# Patient Record
Sex: FEMALE | Race: WHITE | NOT HISPANIC OR LATINO | Employment: UNEMPLOYED | URBAN - METROPOLITAN AREA
[De-identification: names, ages, dates, MRNs, and addresses within clinical notes are randomized per-mention and may not be internally consistent; named-entity substitution may affect disease eponyms.]

---

## 2018-06-07 ENCOUNTER — OFFICE VISIT (OUTPATIENT)
Dept: OPHTHALMOLOGY | Facility: CLINIC | Age: 48
End: 2018-06-07
Payer: COMMERCIAL

## 2018-06-07 DIAGNOSIS — D31.32 CHOROIDAL NEVUS, LEFT EYE: Primary | ICD-10-CM

## 2018-06-07 DIAGNOSIS — H52.03 HYPEROPIA WITH PRESBYOPIA, BILATERAL: ICD-10-CM

## 2018-06-07 DIAGNOSIS — H35.721 RETINAL PIGMENT EPITHELIAL DETACHMENT OF RIGHT EYE: ICD-10-CM

## 2018-06-07 DIAGNOSIS — H52.4 HYPEROPIA WITH PRESBYOPIA, BILATERAL: ICD-10-CM

## 2018-06-07 PROCEDURE — 92250 FUNDUS PHOTOGRAPHY W/I&R: CPT | Mod: S$GLB,,, | Performed by: OPTOMETRIST

## 2018-06-07 PROCEDURE — 92004 COMPRE OPH EXAM NEW PT 1/>: CPT | Mod: S$GLB,,, | Performed by: OPTOMETRIST

## 2018-06-07 PROCEDURE — 92015 DETERMINE REFRACTIVE STATE: CPT | Mod: S$GLB,,, | Performed by: OPTOMETRIST

## 2018-06-07 PROCEDURE — 92310 CONTACT LENS FITTING OU: CPT | Mod: ,,, | Performed by: OPTOMETRIST

## 2018-06-08 NOTE — PROGRESS NOTES
HPI     PTs last exam was 1 year ago. PT c/o blurred overall va and wears cls   full time. otc readers prn. Gls prn.   HPI    Any vision changes since last exam: no  Eye pain: no  Other ocular symptoms: no    Do you wear currently wear glasses or contacts? both    Interested in contacts today? Yes, wears AV 1 day moist    Do you plan on getting new glasses today? If needed      Last edited by Melissa Vieyra MA on 6/7/2018  2:23 PM. (History)            Assessment /Plan     For exam results, see Encounter Report.    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  Baseline mOCT today  Great va, pt states was prev noted at last exam  Monitor 12 months    Hyperopia with presbyopia, bilateral  Eyeglass Final Rx     Eyeglass Final Rx       Sphere Cylinder Axis Add    Right +4.50   +2.00    Left +2.50 +0.50 055 +2.00    Type:  PAL    Expiration Date:  6/8/2019    Comments:  Digitally surfaced progressive recommended              Contact Lens Prescription (6/7/2018)        Brand Base Curve Sphere    Right 1-Day Acuvue Moist 8.50 +4.50    Left 1-Day Acuvue Moist 8.50 +2.50    Expiration Date:  6/8/2019    Replacement:  Daily    Wearing Schedule:  Daily wear        Dispensed trial contact lenses today. Patient is to wear lenses for 1 week. Ok to order supply if no problems. RTC PRN if any problems arise.      RTC 1 yr for dilated eye exam, mOCT and Optos or PRN if any problems.   Discussed above and answered questions.

## 2018-06-15 ENCOUNTER — TELEPHONE (OUTPATIENT)
Dept: OPHTHALMOLOGY | Facility: CLINIC | Age: 48
End: 2018-06-15

## 2018-06-15 NOTE — TELEPHONE ENCOUNTER
----- Message from Justine Freire sent at 6/15/2018  8:50 AM CDT -----  Contact: pt  Pt has a sty and is concerned about swelling in her cheek. Is this normal or does she need to be seen?

## 2018-06-15 NOTE — TELEPHONE ENCOUNTER
Spoke with pt, Romelia is on LLL with some mild swelling below. Offered pt same day appointment with DNL. PT declined. Advised pt to use warm compresses and if not better to go come in Monday morning or if worsens over the weekend to go to urgent care. Pt expressed understanding.

## 2022-04-07 ENCOUNTER — OFFICE VISIT (OUTPATIENT)
Dept: OPHTHALMOLOGY | Facility: CLINIC | Age: 52
End: 2022-04-07
Payer: COMMERCIAL

## 2022-04-07 DIAGNOSIS — H16.002 CORNEAL ULCER OF LEFT EYE: Primary | ICD-10-CM

## 2022-04-07 PROCEDURE — 1159F MED LIST DOCD IN RCRD: CPT | Mod: CPTII,S$GLB,, | Performed by: OPTOMETRIST

## 2022-04-07 PROCEDURE — 99203 OFFICE O/P NEW LOW 30 MIN: CPT | Mod: S$GLB,,, | Performed by: OPTOMETRIST

## 2022-04-07 PROCEDURE — 99999 PR PBB SHADOW E&M-NEW PATIENT-LVL II: ICD-10-PCS | Mod: PBBFAC,,, | Performed by: OPTOMETRIST

## 2022-04-07 PROCEDURE — 99999 PR PBB SHADOW E&M-NEW PATIENT-LVL II: CPT | Mod: PBBFAC,,, | Performed by: OPTOMETRIST

## 2022-04-07 PROCEDURE — 1160F PR REVIEW ALL MEDS BY PRESCRIBER/CLIN PHARMACIST DOCUMENTED: ICD-10-PCS | Mod: CPTII,S$GLB,, | Performed by: OPTOMETRIST

## 2022-04-07 PROCEDURE — 99203 PR OFFICE/OUTPT VISIT, NEW, LEVL III, 30-44 MIN: ICD-10-PCS | Mod: S$GLB,,, | Performed by: OPTOMETRIST

## 2022-04-07 PROCEDURE — 1160F RVW MEDS BY RX/DR IN RCRD: CPT | Mod: CPTII,S$GLB,, | Performed by: OPTOMETRIST

## 2022-04-07 PROCEDURE — 1159F PR MEDICATION LIST DOCUMENTED IN MEDICAL RECORD: ICD-10-PCS | Mod: CPTII,S$GLB,, | Performed by: OPTOMETRIST

## 2022-04-07 RX ORDER — TOBRAMYCIN AND DEXAMETHASONE 3; 1 MG/ML; MG/ML
1-2 SUSPENSION/ DROPS OPHTHALMIC 4 TIMES DAILY
Qty: 5 ML | Refills: 0 | Status: SHIPPED | OUTPATIENT
Start: 2022-04-07 | End: 2022-04-17

## 2022-04-07 NOTE — PROGRESS NOTES
HPI     New patient to [facility]  Laterality: OS  Pain Scale:  2-3  Onset:   Last night  Discharge:   No  A.M. Matting:  No  Itch:   No  Redness:   Yes  Photophobia:   Yes  Foreign body sensation:   Yes  Deep pain:   No  Previous occurrence:   No  Drops:   Otc gtts   Contact lens wear? Yes       Last edited by Kathrine Hardin MA on 4/7/2022  1:31 PM. (History)             Assessment /Plan     For exam results, see Encounter Report.    Corneal ulcer of left eye      Marginal corneal ulcer in left eye, with history of CTL wear. Start Tobradex 1 drop qid. Patient has mild AC reaction. Stop CTL wear. RTC 1 day for f/u before weekend, sooner if any changes to vision or worsening symptoms.

## 2022-04-08 ENCOUNTER — OFFICE VISIT (OUTPATIENT)
Dept: OPHTHALMOLOGY | Facility: CLINIC | Age: 52
End: 2022-04-08
Payer: COMMERCIAL

## 2022-04-08 DIAGNOSIS — H16.002 CORNEAL ULCER OF LEFT EYE: Primary | ICD-10-CM

## 2022-04-08 PROCEDURE — 1159F PR MEDICATION LIST DOCUMENTED IN MEDICAL RECORD: ICD-10-PCS | Mod: CPTII,S$GLB,, | Performed by: OPTOMETRIST

## 2022-04-08 PROCEDURE — 99213 OFFICE O/P EST LOW 20 MIN: CPT | Mod: S$GLB,,, | Performed by: OPTOMETRIST

## 2022-04-08 PROCEDURE — 1159F MED LIST DOCD IN RCRD: CPT | Mod: CPTII,S$GLB,, | Performed by: OPTOMETRIST

## 2022-04-08 PROCEDURE — 1160F PR REVIEW ALL MEDS BY PRESCRIBER/CLIN PHARMACIST DOCUMENTED: ICD-10-PCS | Mod: CPTII,S$GLB,, | Performed by: OPTOMETRIST

## 2022-04-08 PROCEDURE — 99999 PR PBB SHADOW E&M-EST. PATIENT-LVL II: CPT | Mod: PBBFAC,,, | Performed by: OPTOMETRIST

## 2022-04-08 PROCEDURE — 99213 PR OFFICE/OUTPT VISIT, EST, LEVL III, 20-29 MIN: ICD-10-PCS | Mod: S$GLB,,, | Performed by: OPTOMETRIST

## 2022-04-08 PROCEDURE — 1160F RVW MEDS BY RX/DR IN RCRD: CPT | Mod: CPTII,S$GLB,, | Performed by: OPTOMETRIST

## 2022-04-08 PROCEDURE — 99999 PR PBB SHADOW E&M-EST. PATIENT-LVL II: ICD-10-PCS | Mod: PBBFAC,,, | Performed by: OPTOMETRIST

## 2022-04-08 NOTE — PROGRESS NOTES
HPI     Last seen by [facility]  Patient here today for 1 day cornea follow up  Instructed to discontinue CTL wear  Using Tobradex QID OS  Patient states eye is much better no redness    Last edited by Alicia Richards, PCT on 4/8/2022  9:16 AM. (History)            Assessment /Plan     For exam results, see Encounter Report.    Corneal ulcer of left eye    Improving, AC reaction much better. Continue Tobradex 1 drop qid x 6 days. RTC Thursday for f/u with DFE OD and refraction. Sooner if any changes to vision or worsening symptoms.

## 2022-04-14 ENCOUNTER — OFFICE VISIT (OUTPATIENT)
Dept: OPHTHALMOLOGY | Facility: CLINIC | Age: 52
End: 2022-04-14
Payer: COMMERCIAL

## 2022-04-14 DIAGNOSIS — H52.03 HYPEROPIA OF BOTH EYES: ICD-10-CM

## 2022-04-14 DIAGNOSIS — H16.002 CORNEAL ULCER OF LEFT EYE: Primary | ICD-10-CM

## 2022-04-14 PROCEDURE — 92310 PR CONTACT LENS FITTING (NO CHANGE): ICD-10-PCS | Mod: CSM,S$GLB,, | Performed by: OPTOMETRIST

## 2022-04-14 PROCEDURE — 99999 PR PBB SHADOW E&M-EST. PATIENT-LVL II: CPT | Mod: PBBFAC,,, | Performed by: OPTOMETRIST

## 2022-04-14 PROCEDURE — 1160F PR REVIEW ALL MEDS BY PRESCRIBER/CLIN PHARMACIST DOCUMENTED: ICD-10-PCS | Mod: CPTII,S$GLB,, | Performed by: OPTOMETRIST

## 2022-04-14 PROCEDURE — 92310 CONTACT LENS FITTING OU: CPT | Mod: CSM,S$GLB,, | Performed by: OPTOMETRIST

## 2022-04-14 PROCEDURE — 1159F PR MEDICATION LIST DOCUMENTED IN MEDICAL RECORD: ICD-10-PCS | Mod: CPTII,S$GLB,, | Performed by: OPTOMETRIST

## 2022-04-14 PROCEDURE — 1159F MED LIST DOCD IN RCRD: CPT | Mod: CPTII,S$GLB,, | Performed by: OPTOMETRIST

## 2022-04-14 PROCEDURE — 99999 PR PBB SHADOW E&M-EST. PATIENT-LVL II: ICD-10-PCS | Mod: PBBFAC,,, | Performed by: OPTOMETRIST

## 2022-04-14 PROCEDURE — 92012 PR EYE EXAM, EST PATIENT,INTERMED: ICD-10-PCS | Mod: S$GLB,,, | Performed by: OPTOMETRIST

## 2022-04-14 PROCEDURE — 92015 DETERMINE REFRACTIVE STATE: CPT | Mod: S$GLB,,, | Performed by: OPTOMETRIST

## 2022-04-14 PROCEDURE — 92012 INTRM OPH EXAM EST PATIENT: CPT | Mod: S$GLB,,, | Performed by: OPTOMETRIST

## 2022-04-14 PROCEDURE — 1160F RVW MEDS BY RX/DR IN RCRD: CPT | Mod: CPTII,S$GLB,, | Performed by: OPTOMETRIST

## 2022-04-14 PROCEDURE — 92015 PR REFRACTION: ICD-10-PCS | Mod: S$GLB,,, | Performed by: OPTOMETRIST

## 2022-04-14 NOTE — PROGRESS NOTES
HPI     Last seen by [facility]  Patient here today for 7 day cornea follow up with DFE and Refraction  Patient states eye is much better   Patient states she is using tobradex qid OS    Last edited by Maximo Lockett, OD on 4/14/2022 11:17 AM. (History)            Assessment /Plan     For exam results, see Encounter Report.    Corneal ulcer of left eye  Resolved, taper Tobradex bid x 1 week then stop. CTL hygiene reviewed.   Hyperopia of both eyes  Eyeglass Final Rx     Eyeglass Final Rx       Sphere Cylinder Axis Add    Right +5.00 +0.75 140 +2.50    Left +2.75 DS  +2.50    Type: PAL    Expiration Date: 4/14/2023              Contact Lens Final Rx     Final Contact Lens Rx       Brand Base Curve Diameter Sphere    Right Dailies Total 1 8.5 14.1 +5.00    Left Dailies Total 1 8.5 14.1 +2.75    Expiration Date: 4/14/2023    Replacement: Daily    Wearing Schedule: Daily Wear                RTC 1 yr for dilated eye exam or sooner if any changes to vision.   Discussed above and answered questions.

## 2023-11-14 ENCOUNTER — OFFICE VISIT (OUTPATIENT)
Dept: OPHTHALMOLOGY | Facility: CLINIC | Age: 53
End: 2023-11-14
Payer: COMMERCIAL

## 2023-11-14 DIAGNOSIS — H52.203 HYPEROPIA WITH ASTIGMATISM AND PRESBYOPIA, BILATERAL: ICD-10-CM

## 2023-11-14 DIAGNOSIS — H52.03 HYPEROPIA WITH ASTIGMATISM AND PRESBYOPIA, BILATERAL: ICD-10-CM

## 2023-11-14 DIAGNOSIS — D31.32 CHOROIDAL NEVUS OF LEFT EYE: Primary | ICD-10-CM

## 2023-11-14 DIAGNOSIS — H52.4 HYPEROPIA WITH ASTIGMATISM AND PRESBYOPIA, BILATERAL: ICD-10-CM

## 2023-11-14 PROCEDURE — 92014 PR EYE EXAM, EST PATIENT,COMPREHESV: ICD-10-PCS | Mod: S$GLB,,, | Performed by: OPTOMETRIST

## 2023-11-14 PROCEDURE — 1159F PR MEDICATION LIST DOCUMENTED IN MEDICAL RECORD: ICD-10-PCS | Mod: CPTII,S$GLB,, | Performed by: OPTOMETRIST

## 2023-11-14 PROCEDURE — 92015 PR REFRACTION: ICD-10-PCS | Mod: S$GLB,,, | Performed by: OPTOMETRIST

## 2023-11-14 PROCEDURE — 92014 COMPRE OPH EXAM EST PT 1/>: CPT | Mod: S$GLB,,, | Performed by: OPTOMETRIST

## 2023-11-14 PROCEDURE — 92015 DETERMINE REFRACTIVE STATE: CPT | Mod: S$GLB,,, | Performed by: OPTOMETRIST

## 2023-11-14 PROCEDURE — 1159F MED LIST DOCD IN RCRD: CPT | Mod: CPTII,S$GLB,, | Performed by: OPTOMETRIST

## 2023-11-14 PROCEDURE — 92310 CONTACT LENS FITTING OU: CPT | Mod: CSM,S$GLB,, | Performed by: OPTOMETRIST

## 2023-11-14 PROCEDURE — 99999 PR PBB SHADOW E&M-EST. PATIENT-LVL II: ICD-10-PCS | Mod: PBBFAC,,, | Performed by: OPTOMETRIST

## 2023-11-14 PROCEDURE — 92310 PR CONTACT LENS FITTING (NO CHANGE): ICD-10-PCS | Mod: CSM,S$GLB,, | Performed by: OPTOMETRIST

## 2023-11-14 PROCEDURE — 99999 PR PBB SHADOW E&M-EST. PATIENT-LVL II: CPT | Mod: PBBFAC,,, | Performed by: OPTOMETRIST

## 2023-11-14 NOTE — PROGRESS NOTES
HPI     Annual Exam            Comments: Patient reports for annual eye exam.          Last edited by Ebenezer Cano on 11/14/2023  3:01 PM.            Assessment /Plan     For exam results, see Encounter Report.    Choroidal nevus of left eye  Stable, RTC next available for Optos Photo.  Flat, no lipo.    Hyperopia with astigmatism and presbyopia, bilateral  Eyeglass Final Rx       Eyeglass Final Rx         Sphere Cylinder Axis Add    Right +5.00 +0.50 134 +2.50    Left +2.50 +1.00 050 +2.50      Type: PAL    Expiration Date: 11/14/2024              Eyeglass Final Rx #2         Sphere Cylinder Axis Add    Right +5.00 +0.50 134     Left +2.50 +1.00 050       Type: SVL - Distance    Expiration Date: 11/14/2024              Eyeglass Final Rx Comments    [id number]                 Contact Lens Final Rx       Final Contact Lens Rx         Brand Base Curve Diameter Sphere    Right Dailies Total 1 8.5 14.1 +5.50    Left Dailies Total 1 8.5 14.1 +2.75      Expiration Date: 11/14/2024    Replacement: Daily    Wearing Schedule: Daily Wear                OU distance CTL trials fitted today, patient will require +2.25 DS OTC readers for near tasks.     Contact lens trials fitted in office today. Contact lens hygiene reviewed. Patient able to insert the lenses themselves with minimal difficulty. Patient ok to finalize Contact lens after 1 week of wear. RTC if still having difficulty with CTL trial after 1 week.       RTC next available for optos, sooner if changes to vision or worsening symptoms.  Discussed above and answered question.

## 2023-11-27 ENCOUNTER — OFFICE VISIT (OUTPATIENT)
Dept: OPHTHALMOLOGY | Facility: CLINIC | Age: 53
End: 2023-11-27
Payer: COMMERCIAL

## 2023-11-27 DIAGNOSIS — D31.32 CHOROIDAL NEVUS OF LEFT EYE: Primary | ICD-10-CM

## 2023-11-27 DIAGNOSIS — H52.03 HYPEROPIA WITH ASTIGMATISM AND PRESBYOPIA, BILATERAL: ICD-10-CM

## 2023-11-27 DIAGNOSIS — H52.4 HYPEROPIA WITH ASTIGMATISM AND PRESBYOPIA, BILATERAL: ICD-10-CM

## 2023-11-27 DIAGNOSIS — H52.203 HYPEROPIA WITH ASTIGMATISM AND PRESBYOPIA, BILATERAL: ICD-10-CM

## 2023-11-27 DIAGNOSIS — H52.03 HYPEROPIA OF BOTH EYES: ICD-10-CM

## 2023-11-27 PROCEDURE — 99499 UNLISTED E&M SERVICE: CPT | Mod: S$GLB,,, | Performed by: OPTOMETRIST

## 2023-11-27 PROCEDURE — 99499 NO LOS: ICD-10-PCS | Mod: S$GLB,,, | Performed by: OPTOMETRIST

## 2023-11-27 NOTE — PROGRESS NOTES
HPI    Patient reports for testing only. [facility] to call with results per patients   request   Last edited by Alicia Richards, PCT on 11/27/2023  4:04 PM.            Assessment /Plan     For exam results, see Encounter Report.    Choroidal nevus of left eye  Stable per optos photos continue close observation with optos at this time.     Hyperopia with astigmatism and presbyopia, bilateral  Contact Lens Final Rx       Final Contact Lens Rx         Brand Base Curve Diameter Sphere    Right Dailies Total 1 8.5 14.1 +5.50    Left Dailies Total 1 8.5 14.1 +2.75      Expiration Date: 11/27/2024    Replacement: Daily    Solutions: OptiFree PureMoist    Wearing Schedule: Daily Wear                  Contact lens trials fitted in office today. Contact lens hygiene reviewed. Patient able to insert the lenses themselves with minimal difficulty. Patient ok to finalize Contact lens after 1 week of wear. RTC if still having difficulty with CTL trial after 1 week.

## 2023-12-04 ENCOUNTER — PATIENT MESSAGE (OUTPATIENT)
Dept: OPTOMETRY | Facility: CLINIC | Age: 53
End: 2023-12-04
Payer: COMMERCIAL

## 2023-12-15 ENCOUNTER — PATIENT MESSAGE (OUTPATIENT)
Dept: OPTOMETRY | Facility: CLINIC | Age: 53
End: 2023-12-15
Payer: COMMERCIAL

## 2024-03-28 ENCOUNTER — OFFICE VISIT (OUTPATIENT)
Dept: OPHTHALMOLOGY | Facility: CLINIC | Age: 54
End: 2024-03-28
Payer: COMMERCIAL

## 2024-03-28 DIAGNOSIS — S05.01XA ABRASION OF RIGHT CORNEA, INITIAL ENCOUNTER: Primary | ICD-10-CM

## 2024-03-28 PROCEDURE — 1159F MED LIST DOCD IN RCRD: CPT | Mod: CPTII,S$GLB,, | Performed by: OPTOMETRIST

## 2024-03-28 PROCEDURE — 99213 OFFICE O/P EST LOW 20 MIN: CPT | Mod: S$GLB,,, | Performed by: OPTOMETRIST

## 2024-03-28 PROCEDURE — 99999 PR PBB SHADOW E&M-EST. PATIENT-LVL II: CPT | Mod: PBBFAC,,, | Performed by: OPTOMETRIST

## 2024-03-28 RX ORDER — MOXIFLOXACIN 5 MG/ML
1 SOLUTION/ DROPS OPHTHALMIC 4 TIMES DAILY
Qty: 3 ML | Refills: 0 | Status: SHIPPED | OUTPATIENT
Start: 2024-03-28 | End: 2024-03-28

## 2024-03-28 RX ORDER — MOXIFLOXACIN 5 MG/ML
1 SOLUTION/ DROPS OPHTHALMIC 4 TIMES DAILY
Qty: 3 ML | Refills: 0 | Status: SHIPPED | OUTPATIENT
Start: 2024-03-28 | End: 2024-04-04

## 2024-03-28 NOTE — PROGRESS NOTES
HPI     Follow-up            Comments: Pt reports for urgent care because of irritated eyes.           Comments    Laterality: OD.   Pain Scale:  2/10  Onset:   3/27/24  Discharge:   Yes OD.  A.M. Matting:  No.  Itch:   Yes OD  Redness:   OD  Photophobia:   OD  Foreign body sensation:  OD   Deep pain:   No.  Previous occurrence:   NO.   Drops:   Pt recently used Vizen 3/28/24.   Contact lens wear? Yes OU.      Pt symptoms started 3/27/24 when she tried taking out her CTL and felt a   scratch on OD. Pt used Vizen to flush eye. Pt noticed a bump/stye on OD   this morning.           Last edited by Stacy Arias on 3/28/2024  3:01 PM.            Assessment /Plan     For exam results, see Encounter Report.    Abrasion of right cornea, initial encounter  -     moxifloxacin (VIGAMOX) 0.5 % ophthalmic solution; Place 1 drop into the right eye 4 (four) times daily. for 7 days  Dispense: 3 mL; Refill: 0  Stop CTL wear, start prophylactic moxi QID x 7 days.       RTC 1 day for corneal recheck, sooner if any changes to vision or worsening symptoms.

## 2024-03-29 ENCOUNTER — TELEPHONE (OUTPATIENT)
Dept: OPHTHALMOLOGY | Facility: CLINIC | Age: 54
End: 2024-03-29
Payer: COMMERCIAL

## 2024-03-29 NOTE — TELEPHONE ENCOUNTER
54 year old female presents for 1 day f/u of K abrasion  in right eye. Patient reports improvement in symptoms of pain and light sensitivity. Denies vision changes. Currently using moxifloxacin QID to right eye    VA OD  CC 20/30+2    IOP:   Deferred     Slit lamp findings  Conjunctiva 1+ injection  Cornea 1mm corneal abrasion paracentrally with epithelial healing ridge  AC deep and quiet no cell    Plan: Abrasion is improving, continue Moxi QID x 7 days. Stay out of CTL. RTC as needed if symptoms worsen or fail to resolve.